# Patient Record
Sex: FEMALE | Race: WHITE | NOT HISPANIC OR LATINO | URBAN - METROPOLITAN AREA
[De-identification: names, ages, dates, MRNs, and addresses within clinical notes are randomized per-mention and may not be internally consistent; named-entity substitution may affect disease eponyms.]

---

## 2019-04-25 ENCOUNTER — IMPORTED ENCOUNTER (OUTPATIENT)
Dept: URBAN - METROPOLITAN AREA CLINIC 50 | Facility: CLINIC | Age: 24
End: 2019-04-25

## 2019-05-07 ENCOUNTER — IMPORTED ENCOUNTER (OUTPATIENT)
Dept: URBAN - METROPOLITAN AREA CLINIC 50 | Facility: CLINIC | Age: 24
End: 2019-05-07

## 2019-06-07 ENCOUNTER — IMPORTED ENCOUNTER (OUTPATIENT)
Dept: URBAN - METROPOLITAN AREA CLINIC 50 | Facility: CLINIC | Age: 24
End: 2019-06-07

## 2019-06-19 ENCOUNTER — IMPORTED ENCOUNTER (OUTPATIENT)
Dept: URBAN - METROPOLITAN AREA CLINIC 50 | Facility: CLINIC | Age: 24
End: 2019-06-19

## 2019-06-19 NOTE — PATIENT DISCUSSION
"""S/P LASIK OU. Healing well. Flap clear and intact.  Continue post-operative drops as instructed."" ""Continue Oasis Preservative Free Tears both eyes Q2h

## 2019-06-20 ENCOUNTER — IMPORTED ENCOUNTER (OUTPATIENT)
Dept: URBAN - METROPOLITAN AREA CLINIC 50 | Facility: CLINIC | Age: 24
End: 2019-06-20

## 2019-06-26 ENCOUNTER — IMPORTED ENCOUNTER (OUTPATIENT)
Dept: URBAN - METROPOLITAN AREA CLINIC 50 | Facility: CLINIC | Age: 24
End: 2019-06-26

## 2019-06-26 NOTE — PATIENT DISCUSSION
"""S/P LASIK OU. Healing well. Flap clear and intact.  Continue post-operative drops as instructed."" ""Continue Pred-Gati both eyes twice a day

## 2019-07-24 ENCOUNTER — IMPORTED ENCOUNTER (OUTPATIENT)
Dept: URBAN - METROPOLITAN AREA CLINIC 50 | Facility: CLINIC | Age: 24
End: 2019-07-24

## 2019-07-24 NOTE — PATIENT DISCUSSION
"""S/P LASIK OU. Flap clear and intact. Continue artificial tears as instructed. ""
Patient/Caregiver provided printed discharge information.

## 2019-09-09 ENCOUNTER — IMPORTED ENCOUNTER (OUTPATIENT)
Dept: URBAN - METROPOLITAN AREA CLINIC 50 | Facility: CLINIC | Age: 24
End: 2019-09-09

## 2019-09-18 ENCOUNTER — IMPORTED ENCOUNTER (OUTPATIENT)
Dept: URBAN - METROPOLITAN AREA CLINIC 50 | Facility: CLINIC | Age: 24
End: 2019-09-18

## 2019-10-30 ENCOUNTER — IMPORTED ENCOUNTER (OUTPATIENT)
Dept: URBAN - METROPOLITAN AREA CLINIC 50 | Facility: CLINIC | Age: 24
End: 2019-10-30

## 2020-06-18 ENCOUNTER — IMPORTED ENCOUNTER (OUTPATIENT)
Dept: URBAN - METROPOLITAN AREA CLINIC 50 | Facility: CLINIC | Age: 25
End: 2020-06-18

## 2021-04-17 ASSESSMENT — TONOMETRY
OS_IOP_MMHG: 13
OD_IOP_MMHG: 15
OD_IOP_MMHG: 16
OD_IOP_MMHG: 14
OD_IOP_MMHG: 15
OS_IOP_MMHG: 14
OS_IOP_MMHG: 16
OS_IOP_MMHG: 14

## 2021-04-17 ASSESSMENT — VISUAL ACUITY
OD_SC: 20/20
OS_SC: 20/20
OD_SC: 20/20
OD_SC: 20/20+1
OS_SC: 20/25-1
OS_CC: J1+@ 12 IN
OD_CC: 20/20
OS_SC: 20/20
OS_CC: 20/25
OS_SC: 20/50
OD_SC: 20/15
OS_SC: 20/20-2
OD_CC: J1+@ 12 IN
OS_SC: 20/20
OD_CC: 20/20
OD_SC: 20/20-1
OS_CC: 20/20
OD_SC: 20/30
OS_SC: 20/20 FUZZY
OD_SC: 20/20
OD_SC: 20/20

## 2021-04-17 ASSESSMENT — PACHYMETRY
OS_CT_UM: 542
OD_CT_UM: 536
OS_CT_UM: 542
OD_CT_UM: 536
OS_CT_UM: 542
OD_CT_UM: 536
OS_CT_UM: 542
OD_CT_UM: 536
OD_CT_UM: 536
OS_CT_UM: 542